# Patient Record
(demographics unavailable — no encounter records)

---

## 2024-12-19 NOTE — REASON FOR VISIT
[Initial Consultation] : an initial consultation for [Friend] : friend [FreeTextEntry2] : lump on left side of neck/ lymph nodes

## 2024-12-19 NOTE — REVIEW OF SYSTEMS
[Swelling Neck] : swelling neck [Swollen Glands] : swollen glands [Swollen Glands In The Neck] : swollen glands in the neck [Patient Intake Form Reviewed] : Patient intake form was reviewed [Negative] : Heme/Lymph [Hoarseness] : no hoarseness [Throat Clearing] : no throat clearing [Throat Pain] : no throat pain [Throat Dryness] : no throat dryness [Throat Itching] : no throat itching [de-identified] : difficulty swallow

## 2024-12-19 NOTE — CONSULT LETTER
[Consult Letter:] : I had the pleasure of evaluating your patient, [unfilled]. [Please see my note below.] : Please see my note below. [Consult Closing:] : Thank you very much for allowing me to participate in the care of this patient.  If you have any questions, please do not hesitate to contact me. [Sincerely,] : Sincerely, [FreeTextEntry1] : Dear Dr. SEVERO COLEMAN,  Thank you for your kind referral. Please refer to my enclosed office notes for LAUREL ANDINO . If there are any questions free to contact me. [FreeTextEntry3] : Reji Bay MD, FACS

## 2024-12-19 NOTE — PROCEDURE
[de-identified] : Indication for procedure:Unable to examine laryngeal structures with mirror exam.  Difficulty w persistent throat discomfort and excessive throat clearing The patient has left neck node, dysphagia  Scope # 44 Topical anesthesia with viscous xylocaine 2% is applied to the anterior nares. A flexible fiberoptic laryngoscope is than introduced through the nares. The nasopharynx is clear without mass or inflammation. The posterior pharyngeal wall is unremarkable. The tongue base and vallecula are unremarkable.  The hypopharynx is unremarkable and unobstructed. The supraglottic larynx is within normal limits. Both vocal cords are fully mobile with no nodule, polyp or other lesion present. There is no edema or erythema overlying the arytenoid cartilages or the inter-arytenoid space. The subglottic space is clear. The voice has a normal quality. no suspicious lesins noted

## 2024-12-19 NOTE — HISTORY OF PRESENT ILLNESS
[de-identified] : left neck swelling x 1 week to Urgent care and rx pred pred and amox initial soreness now somewhat better longstanding difficulty w swallowing

## 2025-01-03 NOTE — HISTORY OF PRESENT ILLNESS
[de-identified] : 1/3/25- 74 year old male presents for Left neck swelling 2 week f/u Xray Cinesophagram 1/26/25 Still w difficulty swallowing States Left neck swelling may have gotten smaller completed 5 d antibiotic   12/19/24-  Left neck swelling x 1 week to Urgent care and rx pred pred and amox initial soreness now somewhat better longstanding difficulty w swallowing

## 2025-01-03 NOTE — REASON FOR VISIT
[Subsequent Evaluation] : a subsequent evaluation for [FreeTextEntry2] : lump on left side of neck/ lymph nodes

## 2025-02-01 NOTE — PHYSICAL EXAM
[Alert] : alert [Normal Voice/Communication] : normal voice/communication [Healthy Appearing] : healthy appearing [No Acute Distress] : no acute distress [Sclera] : the sclera and conjunctiva were normal [Hearing Threshold Finger Rub Not [place]] : hearing was normal [Normal Lips/Gums] : the lips and gums were normal [Oropharynx] : the oropharynx was normal [Normal Appearance] : the appearance of the neck was normal [No Neck Mass] : no neck mass was observed [No Respiratory Distress] : no respiratory distress [No Acc Muscle Use] : no accessory muscle use [Respiration, Rhythm And Depth] : normal respiratory rhythm and effort [Auscultation Breath Sounds / Voice Sounds] : lungs were clear to auscultation bilaterally [Heart Rate And Rhythm] : heart rate was normal and rhythm regular [Normal S1, S2] : normal S1 and S2 [Bowel Sounds] : normal bowel sounds [Murmurs] : no murmurs [Abdomen Tenderness] : non-tender [No Masses] : no abdominal mass palpated [Abdomen Soft] : soft [] : no hepatosplenomegaly [Oriented To Time, Place, And Person] : oriented to person, place, and time

## 2025-02-01 NOTE — ASSESSMENT
[FreeTextEntry1] : 76yo male with esophageal dysphagia  likely stricture consider PPI pending above will check egd with possible dilation Risks and benefits of procedure(s) discussed with patient in detail, including but not limited to, perforation, bleeding, reaction to anesthesia, missed lesions. will hold metformin per protocol

## 2025-02-01 NOTE — PHYSICAL EXAM
[Alert] : alert [Normal Voice/Communication] : normal voice/communication [Healthy Appearing] : healthy appearing [No Acute Distress] : no acute distress [Sclera] : the sclera and conjunctiva were normal [Hearing Threshold Finger Rub Not [place]] : hearing was normal [Normal Lips/Gums] : the lips and gums were normal [Oropharynx] : the oropharynx was normal [Normal Appearance] : the appearance of the neck was normal [No Neck Mass] : no neck mass was observed [No Respiratory Distress] : no respiratory distress [No Acc Muscle Use] : no accessory muscle use [Respiration, Rhythm And Depth] : normal respiratory rhythm and effort [Auscultation Breath Sounds / Voice Sounds] : lungs were clear to auscultation bilaterally [Heart Rate And Rhythm] : heart rate was normal and rhythm regular [Normal S1, S2] : normal S1 and S2 [Bowel Sounds] : normal bowel sounds [Murmurs] : no murmurs [Abdomen Tenderness] : non-tender [No Masses] : no abdominal mass palpated [Abdomen Soft] : soft [] : no hepatosplenomegaly [Oriented To Time, Place, And Person] : oriented to person, place, and time

## 2025-02-01 NOTE — HISTORY OF PRESENT ILLNESS
[FreeTextEntry1] : 76yo male for evaluation of dysphagia  He has dysphagia to larger solids He feels food getting hung up - unable to tolerate steak, bulkier foods He feels getting stuck in lower chest midline Essentially negative ENT evaluation